# Patient Record
Sex: MALE | URBAN - METROPOLITAN AREA
[De-identification: names, ages, dates, MRNs, and addresses within clinical notes are randomized per-mention and may not be internally consistent; named-entity substitution may affect disease eponyms.]

---

## 2024-09-13 ENCOUNTER — OFFICE VISIT (OUTPATIENT)
Dept: NEUROLOGY | Facility: CLINIC | Age: 34
End: 2024-09-13
Payer: COMMERCIAL

## 2024-09-13 VITALS — SYSTOLIC BLOOD PRESSURE: 120 MMHG | HEART RATE: 69 BPM | DIASTOLIC BLOOD PRESSURE: 81 MMHG | WEIGHT: 177.5 LBS

## 2024-09-13 DIAGNOSIS — G43.019 INTRACTABLE MIGRAINE WITHOUT AURA AND WITHOUT STATUS MIGRAINOSUS: Primary | ICD-10-CM

## 2024-09-13 PROCEDURE — 99999 PR PBB SHADOW E&M-NEW PATIENT-LVL III: CPT | Mod: PBBFAC,,, | Performed by: STUDENT IN AN ORGANIZED HEALTH CARE EDUCATION/TRAINING PROGRAM

## 2024-09-13 RX ORDER — RIMEGEPANT SULFATE 75 MG/75MG
TABLET, ORALLY DISINTEGRATING ORAL
COMMUNITY
Start: 2024-05-03 | End: 2024-09-13

## 2024-09-13 RX ORDER — CETIRIZINE HYDROCHLORIDE 10 MG/1
1 TABLET ORAL DAILY
COMMUNITY
Start: 2024-06-18 | End: 2025-06-18

## 2024-09-13 RX ORDER — UPADACITINIB 30 MG/1
30 TABLET, EXTENDED RELEASE ORAL
COMMUNITY
Start: 2024-10-01

## 2024-09-13 RX ORDER — UPADACITINIB 45 MG/1
TABLET, EXTENDED RELEASE ORAL
COMMUNITY
Start: 2024-07-11

## 2024-09-13 RX ORDER — ERENUMAB-AOOE 70 MG/ML
70 INJECTION SUBCUTANEOUS
Qty: 3 EACH | Refills: 0 | Status: SHIPPED | OUTPATIENT
Start: 2024-09-13 | End: 2024-11-09

## 2024-09-13 RX ORDER — UBROGEPANT 100 MG/1
TABLET ORAL
COMMUNITY
End: 2024-09-13 | Stop reason: SDUPTHER

## 2024-09-13 RX ORDER — UBROGEPANT 100 MG/1
100 TABLET ORAL
Qty: 20 TABLET | Refills: 1 | Status: SHIPPED | OUTPATIENT
Start: 2024-09-13 | End: 2024-10-13

## 2024-09-13 RX ORDER — FLUTICASONE PROPIONATE 50 MCG
SPRAY, SUSPENSION (ML) NASAL
COMMUNITY
Start: 2024-06-05

## 2024-09-13 RX ORDER — MESALAMINE 1000 MG/1
1000 SUPPOSITORY RECTAL
COMMUNITY
Start: 2023-09-28

## 2024-09-13 RX ORDER — MONTELUKAST SODIUM 10 MG/1
10 TABLET ORAL NIGHTLY
COMMUNITY

## 2024-09-13 RX ORDER — MESALAMINE 4 G/60ML
SUSPENSION RECTAL
COMMUNITY
Start: 2024-06-04

## 2024-09-13 NOTE — PROGRESS NOTES
[facility]  Clinic Note    Date of Service: 9/13/2024  Patient seen at the request of: Stacy Chris MD    Reason for Consultation  migraines    Assessment:  [place] is a 34 y.o. male who presents with migraines.    Patient has a history of migraines that began after his diagnosis of UC.  He has already failed both amitriptyline and propranolol as preventative measures.  He has currently found the Ubrelvy has been the best abortive therapy for him at this time.  He continues to have 3-4 migraines per month.  He is currently not on any preventative measures.  Recommend a preventative CGRP inhibitor as an abortive CGRP inhibitor has been effective.    We also spoke about starting migraine supplements.  Will avoid CoQ-10 as this has reportedly exacerbated UC symptoms in the past.    He also has a history of myalgias and dysphagia.  After infliximab was switched to Rinvoq, dysphagia has resolved and myalgias have significantly improved.    He reports that an outside MRI brain was read as normal.        Plan:    - Aimovig 70mg as a preventative  - continue Ubrelvy 100mg as needed  - Headache supplements: Riboflavin (Vit B2) 400mg, Magnesium 400mg (Can get these in [facility].)      - RTC in 3 months      Signed:    Saleem Dumont MD  Neurology/Epilepsy  09/13/2024 10:36 AM      HPI:  [place] is a 34 y.o. male with No past medical history on file.    Per last neurology note:  6/20/22     He has had migraines since he was 25 years old. The pain is pulsatile, bitemporal but sometimes left or right temporal, 9-10 intensity if he doesn't take anything. Frequency is about 1x/week. It responds well to rizatriptan and magnesium citrate for abortive management. He has prodromal symptoms of mild bitemporal pressure, pain in his teeth, and feeling mildly sluggish. He has no aura or other focal neurological symptoms. There is photo/phonophobia. No nausea. Sleep is sometimes helpful. He has rarely woken up  "due to headache. No positional features. It does worsen with valsalva. It improves after having a bowel movement.      He was diagnosed with UC in 2014 and developed migraines the following year. He's noticed an association with bowel movements, in that his most severe headaches are associated with when he's been constipated. Lack of sleep is also a trigger.      He's tried metoprolol before, but it was not clearly helpful. He thinks he's been on amitriptyline.      Sister (35yo) with migraines.      He has a mild history of asthma during early childhood, but hasn't had any issues since middle school. He runs several miles per day. He may sometimes use an inhaler if he has a chest cold.      Prior Visits:   6/20/22: Started propranolol.   8/1/23: Continued propranolol 20mg BID-TID.      Interval History:  Pain in his arms is still present. He is still trying to get to the bottom of what causes it. It is triggered by certain physical activities, like push-ups. He'll get a feeling in his deltoids and biceps that his muscles are going to "pop off" even after 3 push-ups. Symptoms started September 2023. Recent EMG/NCS and MRI brain C-spine. Inflectra was started in August. He'd previously been on infliximab in various forms. Insurance is trying to get him back onto Remicade. The only other drug he started taking around the time was Ubrelvy. He may have initially been taking it every other day during certain periods. When he has inflammation due to IBD symptoms, his migraines get much worse. He was splitting the 100mg to 50mg and taking every other day, ~20 times during the month of September. There had been 1-2 months of missing infliximab infusions.      Symptoms in the arms have gotten slightly better over time, but he's been avoiding exercising as much.      He'd been on amitriptyline before and it didn't help headaches. Before propranolol he was getting migraines 1-2 per week, that is on good weeks when UC was not " "inflamed.       Interval Events:  - patient is now off of propranolol (some efficacy for 2 years but then stopped working and gained weight)  - currently on Ubrelvy 100mg as needed    - for a while patient was on multiple biosimilars for UC, leading to myalgias/dysphagia which have resolved since he switched from infliximab to upadacitinib.  Concern that Ubrelvy could also potentially be contributing.  Patient never got the Nurtec but there was an attempt to switch the abortive therapy.   - myalgias are mostly felt when doing pushups, dysphagia has resolved    - currently having clusters of migraines that average 3-4 per month  - migraines noticeably improved after passing mucousy BMs      This is the extent of the patient's complaints at this time.    No results found for: "TSH", "V12", "[id number]"      Review of Systems:  ROS negative unless noted in HPI    Past Surgical History:  No past surgical history on file.    Family History:  No family history on file.    Social History:       Allergies:  Venlafaxine and Fluticasone propion-salmeterol    Outpatient Medications:  Prior to Admission medications    Not on File       Physical exam:    Vitals: /81 (BP Location: Right arm, Patient Position: Sitting, BP Method: Small (Automatic))   Pulse 69   Wt 80.5 kg (177 lb 7.5 oz)     General:   Sitting in chair, in no distress, well-nourished, well-developed, appears stated age.  Head/Neck:   Normocephalic,atraumatic  Pulm:  Non-labored breathing     Mental Status: Alert and oriented to person, time, place, situation. Speech spontaneous and fluent without paraphasias; no dysarthria  CN:  II: visual fields full  III, IV, VI: EOM intact without nystagmus or diplopia.   V: Sensation to light touch full and symmetric in V1-3. Masseter contraction full bilaterally.   VII: Facial movement full and symmetric.   VIII: Hearing grossly normal to conversation.  IX, X: Palate midline with symmetric elevation.    XI: SCM and " trapezius: 5/5 bilaterally.   XII: Tongue midline without fasciculations.  Motor: Normal bulk and tone throughout all four extremities.   RUE: D: 5/5; B: 5/5; T:  5/5; WF:5/5; WE:  5/5; IO: 5/5   LUE: D: 5/5; B: 5/5; T:  5/5; WF: 5/5; WE:  5/5; IO: 5/5   RLE: HF: 5/5, KE: 5/5, KF: 5/5, DF: 5/5, PF: 5/5  LLE: HF: 5/5, KE: 5/5, KF: 5/5, DF: 5/5, PF: 5/5  No tremors   Sensory: Intact and symmetric to light touch throughout.  Reflexes: RUE: Triceps 2+, biceps 2+, brachioradialis 2+  LUE: Triceps 2+, biceps 2+ brachioradialis 2+  RLE: Knee 2+, ankle 2+  LLE: Knee 2+, ankle 2+  Coordination:  Intact and symmetric to finger-to-nose and heel-to-shin.  Gait:  Intact to casual gait.    Imaging:  All pertinent imaging was personally reviewed.      I spent a total of 41 minutes on the day of the visit. This includes face to face time and non-face to face time preparing to see the patient (eg, review of tests), obtaining and/or reviewing separately obtained history, documenting clinical information in the electronic or other health record, independently interpreting results and communicating results to the patient/family/caregiver, or care coordinator.

## 2024-09-24 ENCOUNTER — PATIENT MESSAGE (OUTPATIENT)
Dept: NEUROLOGY | Facility: CLINIC | Age: 34
End: 2024-09-24
Payer: COMMERCIAL

## 2024-10-03 ENCOUNTER — PATIENT MESSAGE (OUTPATIENT)
Dept: NEUROLOGY | Facility: CLINIC | Age: 34
End: 2024-10-03
Payer: COMMERCIAL

## 2024-10-09 ENCOUNTER — PATIENT MESSAGE (OUTPATIENT)
Dept: NEUROLOGY | Facility: CLINIC | Age: 34
End: 2024-10-09
Payer: COMMERCIAL

## 2024-12-19 NOTE — PROGRESS NOTES
[facility]  Clinic Note    Date of Service: 12/20/2024  Patient seen at the request of: No ref. provider found    Reason for Consultation  migraines    Assessment:  [place] is a 34 y.o. male who presents with migraines.    Patient has a history of migraines that began after his diagnosis of UC.  He has already failed both amitriptyline and propranolol as preventative measures.  He has currently found the Ubrelvy has been the best abortive therapy for him at this time.  He continues to have 3-4 migraines per month.  He is currently not on any preventative measures.  Recommend a preventative CGRP inhibitor as an abortive CGRP inhibitor has been effective.    We also spoke about starting migraine supplements.  Will avoid CoQ-10 as this has reportedly exacerbated UC symptoms in the past.    The combination Ubrelvy and Aimovig has been effective so far.    He also has a history of myalgias and dysphagia.  After infliximab was switched to Rinvoq, dysphagia and myalgias have significantly improved but not completely resolved.  He reports that the fatigable weakness is noticed in all muscle groups.  Myositis antibody panel done in 7/2024 was negative.  He reports that these symptoms tend to fluctuate, associated with some fasciculations.  No upper motor neuron signs seen on exam.  Query the presence of a mild, persistent autoimmune inflammatory myopathy, secondary to a history multiple biosimilar use.  We will refer to Rheumatology for guidance on potentially starting a course of steroids or IVIg.    He reports that an outside MRI brain was read as normal.  EMG nerve conduction study done about a year ago by Dr. Plummer in [place] was reportedly normal.      Plan:    - continue Aimovig 70mg as a preventative  - continue Ubrelvy 100mg as needed  - referral to rheumatology   - hx of being on three different biosimilars for UC   - persistent fatigable weakness even after switching from infliximab to  Rinvoq   - query use of a course of steroids or IVIg?  - EMG/NCS of 2 extremities      - RTC in 3 months      Signed:    Saleem Dumont MD  Neurology/Epilepsy  12/20/2024 10:36 AM        Interval Events:  - patient reports migraines are improving on Aimovig (for 2 weeks) and Ubrelvy  - patient reports exercise intolerance for about a year and a half   - this has been going on for at least 5 years and he seem like his muscles are more fatigable than they were  - EMG/NCS about a year ago was normal (2 extremities, upper and lower on left)    - Anjana Gutierrez is gives the Renvoq        HPI:  [place] is a 34 y.o. male with No past medical history on file.    Per last neurology note:  6/20/22     He has had migraines since he was 25 years old. The pain is pulsatile, bitemporal but sometimes left or right temporal, 9-10 intensity if he doesn't take anything. Frequency is about 1x/week. It responds well to rizatriptan and magnesium citrate for abortive management. He has prodromal symptoms of mild bitemporal pressure, pain in his teeth, and feeling mildly sluggish. He has no aura or other focal neurological symptoms. There is photo/phonophobia. No nausea. Sleep is sometimes helpful. He has rarely woken up due to headache. No positional features. It does worsen with valsalva. It improves after having a bowel movement.      He was diagnosed with UC in 2014 and developed migraines the following year. He's noticed an association with bowel movements, in that his most severe headaches are associated with when he's been constipated. Lack of sleep is also a trigger.      He's tried metoprolol before, but it was not clearly helpful. He thinks he's been on amitriptyline.      Sister (35yo) with migraines.      He has a mild history of asthma during early childhood, but hasn't had any issues since middle school. He runs several miles per day. He may sometimes use an inhaler if he has a chest cold.      Prior Visits:  "  6/20/22: Started propranolol.   8/1/23: Continued propranolol 20mg BID-TID.      Interval History:  Pain in his arms is still present. He is still trying to get to the bottom of what causes it. It is triggered by certain physical activities, like push-ups. He'll get a feeling in his deltoids and biceps that his muscles are going to "pop off" even after 3 push-ups. Symptoms started September 2023. Recent EMG/NCS and MRI brain C-spine. Inflectra was started in August. He'd previously been on infliximab in various forms. Insurance is trying to get him back onto Remicade. The only other drug he started taking around the time was Ubrelvy. He may have initially been taking it every other day during certain periods. When he has inflammation due to IBD symptoms, his migraines get much worse. He was splitting the 100mg to 50mg and taking every other day, ~20 times during the month of September. There had been 1-2 months of missing infliximab infusions.      Symptoms in the arms have gotten slightly better over time, but he's been avoiding exercising as much.      He'd been on amitriptyline before and it didn't help headaches. Before propranolol he was getting migraines 1-2 per week, that is on good weeks when UC was not inflamed.       Interval Events:  - patient is now off of propranolol (some efficacy for 2 years but then stopped working and gained weight)  - currently on Ubrelvy 100mg as needed    - for a while patient was on multiple biosimilars for UC, leading to myalgias/dysphagia which have resolved since he switched from infliximab to upadacitinib.  Concern that Ubrelvy could also potentially be contributing.  Patient never got the Nurtec but there was an attempt to switch the abortive therapy.   - myalgias are mostly felt when doing pushups, dysphagia has resolved    - currently having clusters of migraines that average 3-4 per month  - migraines noticeably improved after passing mucousy BMs      This is the " "extent of the patient's complaints at this time.    No results found for: "TSH", "V12", "[id number]"      Review of Systems:  ROS negative unless noted in HPI    Past Surgical History:  No past surgical history on file.    Family History:  No family history on file.    Social History:       Allergies:  Venlafaxine and Fluticasone propion-salmeterol    Outpatient Medications:  Prior to Admission medications    Not on File       Physical exam:    Vitals: /80 (BP Location: Left arm, Patient Position: Sitting)   Pulse 92   Wt 80 kg (176 lb 5.9 oz)     General:   Sitting in chair, in no distress, well-nourished, well-developed, appears stated age.  Head/Neck:   Normocephalic,atraumatic  Pulm:  Non-labored breathing     Mental Status: Alert and oriented to person, time, place, situation. Speech spontaneous and fluent without paraphasias; no dysarthria  CN:  II: visual fields full  III, IV, VI: EOM intact without nystagmus or diplopia.   V: Sensation to light touch full and symmetric in V1-3. Masseter contraction full bilaterally.   VII: Facial movement full and symmetric.   VIII: Hearing grossly normal to conversation.  IX, X: Palate midline with symmetric elevation.    XI: SCM and trapezius: 5/5 bilaterally.   XII: Tongue midline without fasciculations.  Motor: Normal bulk and tone throughout all four extremities.   RUE: D: 5/5; B: 5/5; T:  5/5; WF:5/5; WE:  5/5; IO: 5/5   LUE: D: 5/5; B: 5/5; T:  5/5; WF: 5/5; WE:  5/5; IO: 5/5   RLE: HF: 5/5, KE: 5/5, KF: 5/5, DF: 5/5, PF: 5/5  LLE: HF: 5/5, KE: 5/5, KF: 5/5, DF: 5/5, PF: 5/5  No tremors   Sensory: Intact and symmetric to light touch throughout.  Reflexes: RUE: Triceps 2+, biceps 2+, brachioradialis 2+  LUE: Triceps 2+, biceps 2+ brachioradialis 2+  RLE: Knee 2+, ankle 2+  LLE: Knee 2+, ankle 2+  Coordination:  Intact and symmetric to finger-to-nose and heel-to-shin.  Gait:  Intact to casual gait.    Imaging:  All pertinent imaging was personally " reviewed.      I spent a total of 40 minutes on the day of the visit. This includes face to face time and non-face to face time preparing to see the patient (eg, review of tests), obtaining and/or reviewing separately obtained history, documenting clinical information in the electronic or other health record, independently interpreting results and communicating results to the patient/family/caregiver, or care coordinator.

## 2024-12-20 ENCOUNTER — OFFICE VISIT (OUTPATIENT)
Dept: NEUROLOGY | Facility: CLINIC | Age: 34
End: 2024-12-20
Payer: COMMERCIAL

## 2024-12-20 VITALS — DIASTOLIC BLOOD PRESSURE: 80 MMHG | WEIGHT: 176.38 LBS | HEART RATE: 92 BPM | SYSTOLIC BLOOD PRESSURE: 122 MMHG

## 2024-12-20 DIAGNOSIS — G43.019 INTRACTABLE MIGRAINE WITHOUT AURA AND WITHOUT STATUS MIGRAINOSUS: Primary | ICD-10-CM

## 2024-12-20 DIAGNOSIS — R53.1 COMPLAINT OF FATIGABLE WEAKNESS: ICD-10-CM

## 2024-12-20 DIAGNOSIS — R53.83 COMPLAINT OF FATIGABLE WEAKNESS: ICD-10-CM

## 2024-12-20 PROCEDURE — 99999 PR PBB SHADOW E&M-EST. PATIENT-LVL III: CPT | Mod: PBBFAC,,, | Performed by: STUDENT IN AN ORGANIZED HEALTH CARE EDUCATION/TRAINING PROGRAM

## 2024-12-20 RX ORDER — ERENUMAB-AOOE 70 MG/ML
70 INJECTION SUBCUTANEOUS
Qty: 1 EACH | Refills: 5 | Status: SHIPPED | OUTPATIENT
Start: 2024-12-20

## 2024-12-20 RX ORDER — UBROGEPANT 100 MG/1
TABLET ORAL
COMMUNITY
Start: 2024-12-08 | End: 2024-12-20 | Stop reason: SDUPTHER

## 2024-12-20 RX ORDER — FLUOXETINE 20 MG/1
1 TABLET ORAL DAILY
COMMUNITY

## 2024-12-20 RX ORDER — UBROGEPANT 100 MG/1
100 TABLET ORAL
Qty: 1 TABLET | Refills: 5 | Status: SHIPPED | OUTPATIENT
Start: 2024-12-20

## 2024-12-20 RX ORDER — ERENUMAB-AOOE 70 MG/ML
INJECTION SUBCUTANEOUS
COMMUNITY
Start: 2024-12-10 | End: 2024-12-20 | Stop reason: SDUPTHER

## 2025-01-03 NOTE — PROGRESS NOTES
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  Symptoms are in all 4 extremities.  Had some tingling in the feet when this first started. Some muscle twitching in the thighs and arms.  Reports some difficulty swallowing primarily with liquids.  Symptoms are worse with activity.   Exam:  5/5 strength bilateral hip flexion, knee extension, ankle dorsiflexion, ankle plantar flexion, toe extension, shoulder abduction, elbow flexion, elbow extension, pronation, finger flexion, finger abduction      Sensory NCS      Nerve / Sites Rec. Site Onset Lat Peak Lat NP Amp PP Amp Segments Distance Velocity Comment     ms ms µV µV  mm m/s    L Sural - (Antidromic)      Calf Ankle 2.92 3.70 24.6 25.8 Calf - Ankle 140 48    R Sural - (Antidromic)      Calf Ankle 2.81 3.59 35.4 41.4 Calf - Ankle 140 50    R Ulnar - Dig V (Antidromic)      Wrist Dig V 2.45 3.02 29.5 38.6 Wrist - Dig V 140 57        Motor NCS      Nerve / Sites Muscle Latency Amplitude Segments Dist. Lat Diff Velocity Comments     ms mV  mm ms m/s    R Peroneal - EDB      Ankle EDB 4.71 9.7 Ankle - EDB 80         B. Fib Head EDB 10.81 8.7 B. Fib Head - Ankle 310 6.10 50.8    R Tibial - AH      Ankle AH 3.88 22.2 Ankle - AH 80      L Tibial - AH      Ankle AH 3.75 18.4 Ankle - AH 80      L Peroneal - EDB      Ankle EDB 4.83 7.9 Ankle - EDB 80         B. Fib Head EDB 10.94 6.8 B. Fib Head - Ankle 298 6.10 48.8    R Ulnar - ADM      Wrist ADM 2.38 12.0 Wrist -  ADM 80         B.Elbow ADM 5.77 11.4 B.Elbow - Wrist 220 3.40 64.8        EMG Summary Table     Spontaneous MUAP Recruitment   Muscle IA Fib PSW Fasc H.F. Amp Dur. PPP Pattern   R. Tibialis anterior N None None None None N N N N   R. Gastrocnemius (Medial head) N None None None None N N N N   R. Peroneus longus N None None None None N N N N   R. Vastus medialis N None None None None 1+ 1+ N Reduced   R. Vastus lateralis N None None None None 1+ 1+ N N   R. Adductor longus N None None None None N N N N   L. Vastus medialis N None None None None N N N N   L. Tibialis anterior N None None None None N N N N   R. Deltoid N None None None None N N N N   R. Biceps brachii N None None None None N N N N   R. Triceps brachii N None None None None N N N N   R. Pronator teres N None None None None N N N N   R. First dorsal interosseous N None None None None N N N N       Summary    The sensory conduction test was normal in all 3 of the tested nerves: L Sural - (Antidromic), R Sural - (Antidromic), R Ulnar - Dig V (Antidromic).    Motor:  The bilateral peroneal, bilateral tibial, and right ulnar motor nerves were normal.    The needle EMG examination was performed in 13 muscles. It was normal in 11 muscle(s): R. Tibialis anterior, R. Gastrocnemius (Medial head), R. Peroneus longus, R. Adductor longus, L. Vastus medialis, L. Tibialis anterior, R. Deltoid, R. Biceps brachii, R. Triceps brachii, R. Pronator teres, R. First dorsal interosseous. The study was abnormal in 2 muscle(s), with the following distribution:  The right vastus medialis demonstrated mildly reduced recruitment of mildly large, long duration motor units.  The right vastus lateralis demonstrated mildly large, long duration motor units.        Conclusion:  Mostly normal study    There is no electrodiagnostic evidence of a myopathy  There is possible electrodiagnostic evidence of a mild chronic right L3 and/or L4 radiculopathy without active/ongoing motor  denervation, however confirmatory findings in other L3 and/or L4 muscles were not found and he denies any radicular symptoms.  There is no electrodiagnostic evidence of a lower limb mononeuropathy on either side.  There is no electrodiagnostic evidence of a motor neuron disease.  There is no electrodiagnostic evidence of a peripheral polyneuropathy.    ________________________  Mima Lin MD

## 2025-01-06 ENCOUNTER — OFFICE VISIT (OUTPATIENT)
Dept: PHYSICAL MEDICINE AND REHAB | Facility: CLINIC | Age: 35
End: 2025-01-06
Payer: COMMERCIAL

## 2025-01-06 DIAGNOSIS — R53.1 COMPLAINT OF FATIGABLE WEAKNESS: ICD-10-CM

## 2025-01-06 DIAGNOSIS — R53.83 COMPLAINT OF FATIGABLE WEAKNESS: ICD-10-CM

## 2025-01-06 PROCEDURE — 95910 NRV CNDJ TEST 7-8 STUDIES: CPT | Mod: S$GLB,,, | Performed by: STUDENT IN AN ORGANIZED HEALTH CARE EDUCATION/TRAINING PROGRAM

## 2025-01-06 PROCEDURE — 95886 MUSC TEST DONE W/N TEST COMP: CPT | Mod: S$GLB,,, | Performed by: STUDENT IN AN ORGANIZED HEALTH CARE EDUCATION/TRAINING PROGRAM

## 2025-01-06 PROCEDURE — 99499 UNLISTED E&M SERVICE: CPT | Mod: S$GLB,,, | Performed by: STUDENT IN AN ORGANIZED HEALTH CARE EDUCATION/TRAINING PROGRAM

## 2025-01-06 PROCEDURE — 95885 MUSC TST DONE W/NERV TST LIM: CPT | Mod: 59,S$GLB,, | Performed by: STUDENT IN AN ORGANIZED HEALTH CARE EDUCATION/TRAINING PROGRAM

## 2025-03-12 ENCOUNTER — OFFICE VISIT (OUTPATIENT)
Dept: URGENT CARE | Facility: CLINIC | Age: 35
End: 2025-03-12
Payer: COMMERCIAL

## 2025-03-12 VITALS
OXYGEN SATURATION: 96 % | HEART RATE: 80 BPM | HEIGHT: 68 IN | DIASTOLIC BLOOD PRESSURE: 83 MMHG | WEIGHT: 176 LBS | RESPIRATION RATE: 18 BRPM | BODY MASS INDEX: 26.67 KG/M2 | SYSTOLIC BLOOD PRESSURE: 122 MMHG | TEMPERATURE: 99 F

## 2025-03-12 DIAGNOSIS — J20.9 ACUTE BRONCHITIS DUE TO INFECTION: Primary | ICD-10-CM

## 2025-03-12 DIAGNOSIS — R05.9 COUGH, UNSPECIFIED TYPE: ICD-10-CM

## 2025-03-12 PROCEDURE — 99213 OFFICE O/P EST LOW 20 MIN: CPT | Mod: S$GLB,,, | Performed by: FAMILY MEDICINE

## 2025-03-12 RX ORDER — DOXYCYCLINE HYCLATE 100 MG
100 TABLET ORAL 2 TIMES DAILY
Qty: 14 TABLET | Refills: 0 | Status: SHIPPED | OUTPATIENT
Start: 2025-03-12 | End: 2025-03-19

## 2025-03-12 RX ORDER — BENZONATATE 200 MG/1
200 CAPSULE ORAL 3 TIMES DAILY PRN
Qty: 21 CAPSULE | Refills: 0 | Status: SHIPPED | OUTPATIENT
Start: 2025-03-12 | End: 2025-03-19

## 2025-03-12 NOTE — PROGRESS NOTES
"Subjective:      Patient ID: New [place]iah is a 35 y.o. male.    Vitals:  height is 5' 8" (1.727 m) and weight is 79.8 kg (176 lb). His oral temperature is 98.6 °F (37 °C). His blood pressure is 122/83 and his pulse is 80. His respiration is 18 and oxygen saturation is 96%.     Chief Complaint: Cough    Patient presents with complaint of cough and chest congestion for last 3 weeks.  States the symptoms started with upper respiratory symptoms which resolved within 1 week but came back about 1 week ago with more severe symptoms and greenish phlegm.  Patient denies fever, chills, chest pain, SOB, weakness.    Cough  This is a new problem. The current episode started 1 to 4 weeks ago (3 weeks). The problem has been gradually worsening. The problem occurs constantly. The cough is Productive of sputum. Pertinent negatives include no chest pain, chills, ear congestion, ear pain, fever, headaches, heartburn, hemoptysis, myalgias, nasal congestion, postnasal drip, rash, rhinorrhea, sore throat, shortness of breath, sweats, weight loss or wheezing. He has tried nothing for the symptoms. The treatment provided no relief. His past medical history is significant for asthma. There is no history of bronchiectasis, bronchitis, COPD, emphysema, environmental allergies or pneumonia.       Constitution: Negative for chills and fever.   HENT:  Negative for ear pain, postnasal drip and sore throat.    Cardiovascular:  Negative for chest pain.   Respiratory:  Positive for cough. Negative for bloody sputum, shortness of breath and wheezing.    Gastrointestinal:  Negative for heartburn.   Musculoskeletal:  Negative for muscle ache.   Skin:  Negative for rash.   Allergic/Immunologic: Negative for environmental allergies.   Neurological:  Negative for headaches.      Objective:     Physical Exam   Constitutional: He is oriented to person, place, and time. He appears well-developed. He is cooperative.  Non-toxic appearance. He does not " appear ill. No distress.   HENT:   Head: Normocephalic and atraumatic.   Ears:   Right Ear: Hearing, tympanic membrane, external ear and ear canal normal. no impacted cerumen  Left Ear: Hearing, tympanic membrane, external ear and ear canal normal. no impacted cerumen  Nose: Congestion present. No mucosal edema, rhinorrhea or nasal deformity. No epistaxis. Right sinus exhibits no maxillary sinus tenderness and no frontal sinus tenderness. Left sinus exhibits no maxillary sinus tenderness and no frontal sinus tenderness.   Mouth/Throat: Uvula is midline, oropharynx is clear and moist and mucous membranes are normal. No trismus in the jaw. Normal dentition. No uvula swelling. No oropharyngeal exudate, posterior oropharyngeal edema or posterior oropharyngeal erythema.   Eyes: Conjunctivae and lids are normal. No scleral icterus.   Neck: Trachea normal and phonation normal. Neck supple. No edema present. No erythema present. No neck rigidity present.   Cardiovascular: Normal rate, regular rhythm, normal heart sounds and normal pulses.   No murmur heard.  Pulmonary/Chest: Effort normal and breath sounds normal. No stridor. No respiratory distress. He has no decreased breath sounds. He has no wheezes. He has no rhonchi. He has no rales.   Abdominal: Normal appearance. He exhibits no distension. There is no abdominal tenderness.   Musculoskeletal: Normal range of motion.         General: No deformity. Normal range of motion.      Cervical back: He exhibits no tenderness.   Lymphadenopathy:     He has no cervical adenopathy.   Neurological: He is alert, oriented to person, place, and time and at baseline. He exhibits normal muscle tone. Coordination normal.   Skin: Skin is warm, dry, intact, not diaphoretic and not pale.   Psychiatric: His speech is normal and behavior is normal. Judgment and thought content normal.   Nursing note and vitals reviewed.      Assessment:     1. Acute bronchitis due to infection    2. Cough,  unspecified type        Plan:   Discussed exam findings/results/diagnosis/plan with patient. Advised to f/u with PCP within 2-5 days. ER precautions given if symptoms get any worse. All questions answered. Patient verbally understood and agreed with treatment plan.  Educational materials and instructions regarding the visit diagnosis and management provided.     Acute bronchitis due to infection    Cough, unspecified type    Other orders  -     doxycycline (VIBRA-TABS) 100 MG tablet; Take 1 tablet (100 mg total) by mouth 2 (two) times daily. for 7 days  Dispense: 14 tablet; Refill: 0  -     benzonatate (TESSALON) 200 MG capsule; Take 1 capsule (200 mg total) by mouth 3 (three) times daily as needed for Cough.  Dispense: 21 capsule; Refill: 0

## 2025-03-13 NOTE — PROGRESS NOTES
[facility]  Clinic Note    Date of Service: 3/13/2025  Patient seen at the request of: No ref. provider found    Reason for Consultation  migraines    Assessment:  [place] is a 35 y.o. male who presents with migraines.    Patient has a history of migraines that began after his diagnosis of UC.  He has already failed both amitriptyline and propranolol as preventative measures.  He has currently found the Ubrelvy has been the best abortive therapy for him at this time.  He continues to have 3-4 migraines per month.  He is currently not on any preventative measures.  Recommend a preventative CGRP inhibitor as an abortive CGRP inhibitor has been effective.    We also spoke about starting migraine supplements.  Will avoid CoQ-10 as this has reportedly exacerbated UC symptoms in the past.    The combination Ubrelvy and Aimovig has been effective so far.    He also has a history of myalgias and dysphagia.  After infliximab was switched to Rinvoq, dysphagia and myalgias have significantly improved but not completely resolved.  He reports that the fatigable weakness is noticed in all muscle groups.  Myositis antibody panel done in 7/2024 was negative.  He reports that these symptoms tend to fluctuate, associated with some fasciculations.  No upper motor neuron signs seen on exam.  Query the presence of a mild, persistent autoimmune inflammatory myopathy, secondary to a history multiple biosimilar use.  Referred to Rheumatology for guidance on potentially starting a course of steroids or IVIg.    Myalgias and dysphagia have now resolved after seeing steady improvement at the end of 2024.  Query effect of coming off of infliximab.    He reports that an outside MRI brain was read as normal.  EMG nerve conduction study done about a year ago by Dr. Plummer in [place] was reportedly normal.    EMG/NCS -   There is no electrodiagnostic evidence of a myopathy  There is possible electrodiagnostic evidence of a mild  chronic right L3 and/or L4 radiculopathy without active/ongoing motor denervation, however confirmatory findings in other L3 and/or L4 muscles were not found and he denies any radicular symptoms.  There is no electrodiagnostic evidence of a lower limb mononeuropathy on either side.  There is no electrodiagnostic evidence of a motor neuron disease.  There is no electrodiagnostic evidence of a peripheral polyneuropathy.      Plan:    - continue Aimovig 70mg as a preventative  - continue Ubrelvy 100mg as needed      - RTC in 3 months      Signed:    Saleem Dumont MD  Neurology/Epilepsy  03/13/2025 10:36 AM        Interval Events:  - Patient reports myalgias and dysphagia slowly improves in December of 2023 and is now back to baseline since January of 2025.  - Patient using Ubrelvy about once every 1.5 weeks.  - Patient's mother was recently diagnoses with GBM now s/p resection.      - patient reports migraines are improving on Aimovig (for 2 weeks) and Ubrelvy  - patient reports exercise intolerance for about a year and a half   - this has been going on for at least 5 years and he seem like his muscles are more fatigable than they were  - EMG/NCS about a year ago was normal (2 extremities, upper and lower on left)    - Anjana Gutierrez is gives the Renvoq        HPI:  [place] is a 35 y.o. male with No past medical history on file.    Per last neurology note:  6/20/22     He has had migraines since he was 25 years old. The pain is pulsatile, bitemporal but sometimes left or right temporal, 9-10 intensity if he doesn't take anything. Frequency is about 1x/week. It responds well to rizatriptan and magnesium citrate for abortive management. He has prodromal symptoms of mild bitemporal pressure, pain in his teeth, and feeling mildly sluggish. He has no aura or other focal neurological symptoms. There is photo/phonophobia. No nausea. Sleep is sometimes helpful. He has rarely woken up due to headache. No positional  "features. It does worsen with valsalva. It improves after having a bowel movement.      He was diagnosed with UC in 2014 and developed migraines the following year. He's noticed an association with bowel movements, in that his most severe headaches are associated with when he's been constipated. Lack of sleep is also a trigger.      He's tried metoprolol before, but it was not clearly helpful. He thinks he's been on amitriptyline.      Sister (35yo) with migraines.      He has a mild history of asthma during early childhood, but hasn't had any issues since middle school. He runs several miles per day. He may sometimes use an inhaler if he has a chest cold.      Prior Visits:   6/20/22: Started propranolol.   8/1/23: Continued propranolol 20mg BID-TID.      Interval History:  Pain in his arms is still present. He is still trying to get to the bottom of what causes it. It is triggered by certain physical activities, like push-ups. He'll get a feeling in his deltoids and biceps that his muscles are going to "pop off" even after 3 push-ups. Symptoms started September 2023. Recent EMG/NCS and MRI brain C-spine. Inflectra was started in August. He'd previously been on infliximab in various forms. Insurance is trying to get him back onto Remicade. The only other drug he started taking around the time was Ubrelvy. He may have initially been taking it every other day during certain periods. When he has inflammation due to IBD symptoms, his migraines get much worse. He was splitting the 100mg to 50mg and taking every other day, ~20 times during the month of September. There had been 1-2 months of missing infliximab infusions.      Symptoms in the arms have gotten slightly better over time, but he's been avoiding exercising as much.      He'd been on amitriptyline before and it didn't help headaches. Before propranolol he was getting migraines 1-2 per week, that is on good weeks when UC was not inflamed.       Interval " "Events:  - patient is now off of propranolol (some efficacy for 2 years but then stopped working and gained weight)  - currently on Ubrelvy 100mg as needed    - for a while patient was on multiple biosimilars for UC, leading to myalgias/dysphagia which have resolved since he switched from infliximab to upadacitinib.  Concern that Ubrelvy could also potentially be contributing.  Patient never got the Nurtec but there was an attempt to switch the abortive therapy.   - myalgias are mostly felt when doing pushups, dysphagia has resolved    - currently having clusters of migraines that average 3-4 per month  - migraines noticeably improved after passing mucousy BMs      This is the extent of the patient's complaints at this time.    No results found for: "TSH", "V12", "[id number]"      Review of Systems:  ROS negative unless noted in HPI    Past Surgical History:  No past surgical history on file.    Family History:  No family history on file.    Social History:  Social History     Tobacco Use    Smoking status: Never    Smokeless tobacco: Never       Allergies:  Venlafaxine and Fluticasone propion-salmeterol    Outpatient Medications:  Prior to Admission medications    Not on File       Physical exam:    Vitals: There were no vitals taken for this visit.    General:   Sitting in chair, in no distress, well-nourished, well-developed, appears stated age.  Head/Neck:   Normocephalic,atraumatic  Pulm:  Non-labored breathing     Mental Status: Alert and oriented to person, time, place, situation. Speech spontaneous and fluent without paraphasias; no dysarthria  CN:  II: visual fields full  III, IV, VI: EOM intact without nystagmus or diplopia.   V: Sensation to light touch full and symmetric in V1-3. [place] contraction full bilaterally.   VII: Facial movement full and symmetric.   VIII: Hearing grossly normal to conversation.  IX, X: Palate midline with symmetric elevation.    XI: SCM and trapezius: 5/5 bilaterally.   XII: " Tongue midline without fasciculations.  Motor: Normal bulk and tone throughout all four extremities.   RUE: D: 5/5; B: 5/5; T:  5/5; WF:5/5; WE:  5/5; IO: 5/5   LUE: D: 5/5; B: 5/5; T:  5/5; WF: 5/5; WE:  5/5; IO: 5/5   RLE: HF: 5/5, KE: 5/5, KF: 5/5, DF: 5/5, PF: 5/5  LLE: HF: 5/5, KE: 5/5, KF: 5/5, DF: 5/5, PF: 5/5  No tremors   Sensory: Intact and symmetric to light touch throughout.  Reflexes: RUE: Triceps 2+, biceps 2+, brachioradialis 2+  LUE: Triceps 2+, biceps 2+ brachioradialis 2+  RLE: Knee 2+, ankle 2+  LLE: Knee 2+, ankle 2+  Coordination:  Intact and symmetric to finger-to-nose and heel-to-shin.  Gait:  Intact to casual gait.    Imaging:  All pertinent imaging was personally reviewed.      I spent a total of 35 minutes on the day of the visit. This includes face to face time and non-face to face time preparing to see the patient (eg, review of tests), obtaining and/or reviewing separately obtained history, documenting clinical information in the electronic or other health record, independently interpreting results and communicating results to the patient/family/caregiver, or care coordinator.

## 2025-03-14 ENCOUNTER — OFFICE VISIT (OUTPATIENT)
Dept: NEUROLOGY | Facility: CLINIC | Age: 35
End: 2025-03-14
Payer: COMMERCIAL

## 2025-03-14 VITALS
HEART RATE: 70 BPM | SYSTOLIC BLOOD PRESSURE: 125 MMHG | DIASTOLIC BLOOD PRESSURE: 81 MMHG | BODY MASS INDEX: 28.83 KG/M2 | WEIGHT: 189.63 LBS

## 2025-03-14 DIAGNOSIS — G43.019 INTRACTABLE MIGRAINE WITHOUT AURA AND WITHOUT STATUS MIGRAINOSUS: Primary | ICD-10-CM

## 2025-03-14 PROCEDURE — 99999 PR PBB SHADOW E&M-EST. PATIENT-LVL III: CPT | Mod: PBBFAC,,, | Performed by: STUDENT IN AN ORGANIZED HEALTH CARE EDUCATION/TRAINING PROGRAM

## 2025-03-14 RX ORDER — UBROGEPANT 100 MG/1
100 TABLET ORAL
Qty: 10 TABLET | Refills: 5 | Status: SHIPPED | OUTPATIENT
Start: 2025-03-14

## 2025-03-14 RX ORDER — ERENUMAB-AOOE 70 MG/ML
70 INJECTION SUBCUTANEOUS
Qty: 1 EACH | Refills: 5 | Status: SHIPPED | OUTPATIENT
Start: 2025-03-14

## 2025-03-14 NOTE — PROGRESS NOTES
Patient reports myalgias and dysphagia slowly improves in December of 2023 and is now back to baseline since January of 2025.    Patient's mother was recently diagnoses with GBM now s/p resection.      Patient using Ubrelvy about once every 1.5 weeks.

## 2025-03-25 ENCOUNTER — OFFICE VISIT (OUTPATIENT)
Dept: URGENT CARE | Facility: CLINIC | Age: 35
End: 2025-03-25
Payer: COMMERCIAL

## 2025-03-25 VITALS
HEIGHT: 68 IN | BODY MASS INDEX: 26.52 KG/M2 | TEMPERATURE: 99 F | SYSTOLIC BLOOD PRESSURE: 116 MMHG | WEIGHT: 175 LBS | RESPIRATION RATE: 19 BRPM | DIASTOLIC BLOOD PRESSURE: 79 MMHG | OXYGEN SATURATION: 98 % | HEART RATE: 91 BPM

## 2025-03-25 DIAGNOSIS — B96.89 BACTERIAL UPPER RESPIRATORY INFECTION: Primary | ICD-10-CM

## 2025-03-25 DIAGNOSIS — J06.9 BACTERIAL UPPER RESPIRATORY INFECTION: Primary | ICD-10-CM

## 2025-03-25 DIAGNOSIS — R05.9 COUGH, UNSPECIFIED TYPE: ICD-10-CM

## 2025-03-25 PROCEDURE — 71046 X-RAY EXAM CHEST 2 VIEWS: CPT | Mod: S$GLB,,, | Performed by: RADIOLOGY

## 2025-03-25 PROCEDURE — 99214 OFFICE O/P EST MOD 30 MIN: CPT | Mod: S$GLB,,, | Performed by: NURSE PRACTITIONER

## 2025-03-25 RX ORDER — AMOXICILLIN AND CLAVULANATE POTASSIUM 875; 125 MG/1; MG/1
1 TABLET, FILM COATED ORAL 2 TIMES DAILY
Qty: 20 TABLET | Refills: 0 | Status: SHIPPED | OUTPATIENT
Start: 2025-03-25

## 2025-03-25 RX ORDER — PREDNISONE 20 MG/1
40 TABLET ORAL DAILY
Qty: 10 TABLET | Refills: 0 | Status: SHIPPED | OUTPATIENT
Start: 2025-03-25 | End: 2025-03-30

## 2025-03-25 RX ORDER — BENZONATATE 200 MG/1
200 CAPSULE ORAL 3 TIMES DAILY PRN
Qty: 30 CAPSULE | Refills: 0 | Status: SHIPPED | OUTPATIENT
Start: 2025-03-25 | End: 2025-04-04

## 2025-03-25 NOTE — PATIENT INSTRUCTIONS
- You must understand that you have received an Urgent Care treatment only and that you may be released before all of your medical problems are known or treated.   - You, the patient, will arrange for follow up care as instructed.   - If your condition worsens or fails to improve we recommend that you receive another evaluation at the ER immediately or contact your PCP to discuss your concerns.   - You can call [phone] or [phone] to help schedule an appointment with the appropriate provider.      Drink plenty of fluids   Get lots of rest  Tylenol or ibuprofen for pain/fever  Mucinex DM for cough  Saline nasal rinses to irrigate sinus cavities  Warm salt water gargles for sore throat    
[phone]

## 2025-03-25 NOTE — PROGRESS NOTES
"Subjective:      Patient ID: [place] is a 35 y.o. male.    Vitals:  height is 5' 8" (1.727 m) and weight is 79.4 kg (175 lb). His oral temperature is 99.1 °F (37.3 °C). His blood pressure is 116/79 and his pulse is 91. His respiration is 19 and oxygen saturation is 98%.     Chief Complaint: Cough    Pt is a 34 yo male presenting with a persisting cough. Pt was seen 13 days ago and was given 7 day antibiotics. Onset of symptoms was 2 weeks ago. Pt reports using prescription antibiotics  that gave relief for a little while and cough came back but I worst when laying down.      Cough  This is a new problem. The current episode started 1 to 4 weeks ago. The problem has been unchanged. The problem occurs constantly. The cough is Productive of sputum. Pertinent negatives include no chest pain, ear congestion, ear pain, headaches, nasal congestion, postnasal drip, sore throat, shortness of breath or wheezing. His past medical history is significant for asthma and bronchitis. There is no history of COPD or pneumonia.       HENT:  Negative for ear pain, postnasal drip and sore throat.    Cardiovascular:  Negative for chest pain.   Respiratory:  Positive for cough. Negative for shortness of breath and wheezing.    Neurological:  Negative for headaches.      Objective:     Physical Exam   Constitutional: He is oriented to person, place, and time. He appears well-developed. He is cooperative.  Non-toxic appearance. He does not appear ill. No distress.   HENT:   Head: Normocephalic and atraumatic.   Ears:   Right Ear: Hearing, tympanic membrane, external ear and ear canal normal.   Left Ear: Hearing, tympanic membrane, external ear and ear canal normal.   Nose: Nose normal. No mucosal edema, rhinorrhea or nasal deformity. No epistaxis. Right sinus exhibits no maxillary sinus tenderness and no frontal sinus tenderness. Left sinus exhibits no maxillary sinus tenderness and no frontal sinus tenderness.   Mouth/Throat: " Uvula is midline, oropharynx is clear and moist and mucous membranes are normal. No trismus in the jaw. Normal dentition. No uvula swelling. No oropharyngeal exudate, posterior oropharyngeal edema or posterior oropharyngeal erythema. Tonsils are 1+ on the right. Tonsils are 1+ on the left. No tonsillar exudate.   Eyes: Conjunctivae and lids are normal. No scleral icterus.   Neck: Trachea normal and phonation normal. Neck supple. No edema present. No erythema present. No neck rigidity present.   Cardiovascular: Normal rate, regular rhythm, normal heart sounds and normal pulses.   Pulmonary/Chest: Effort normal and breath sounds normal. No respiratory distress. He has no decreased breath sounds. He has no wheezes. He has no rhonchi.   cough         Comments: cough    Abdominal: Normal appearance.   Musculoskeletal: Normal range of motion.         General: No deformity. Normal range of motion.   Neurological: He is alert and oriented to person, place, and time. He exhibits normal muscle tone. Coordination normal.   Skin: Skin is warm, dry, intact, not diaphoretic and not pale.   Psychiatric: His speech is normal and behavior is normal. Judgment and thought content normal.   Nursing note and vitals reviewed.    XR CHEST PA AND LATERAL  Result Date: 3/25/2025  EXAMINATION: XR CHEST PA AND LATERAL CLINICAL HISTORY: Cough, unspecified TECHNIQUE: PA and lateral views of the chest were performed. COMPARISON: None FINDINGS: Lungs are clear. No focal consolidation. No pleural effusion. No pneumothorax. Normal heart size.     No acute findings Electronically signed by: Zahraa Adair Date:    03/25/2025 Time:    17:53      Assessment:     1. Bacterial upper respiratory infection    2. Cough, unspecified type        Plan:       Bacterial upper respiratory infection  -     amoxicillin-clavulanate 875-125mg (AUGMENTIN) 875-125 mg per tablet; Take 1 tablet by mouth 2 (two) times daily.  Dispense: 20 tablet; Refill: 0    Cough,  unspecified type  -     XR CHEST PA AND LATERAL; Future; Expected date: 03/25/2025  -     benzonatate (TESSALON) 200 MG capsule; Take 1 capsule (200 mg total) by mouth 3 (three) times daily as needed for Cough.  Dispense: 30 capsule; Refill: 0  -     predniSONE (DELTASONE) 20 MG tablet; Take 2 tablets (40 mg total) by mouth once daily. for 5 days  Dispense: 10 tablet; Refill: 0      Patient Instructions   - You must understand that you have received an Urgent Care treatment only and that you may be released before all of your medical problems are known or treated.   - You, the patient, will arrange for follow up care as instructed.   - If your condition worsens or fails to improve we recommend that you receive another evaluation at the ER immediately or contact your PCP to discuss your concerns.   - You can call [phone] or [phone] to help schedule an appointment with the appropriate provider.      Drink plenty of fluids   Get lots of rest  Tylenol or ibuprofen for pain/fever  Mucinex DM for cough  Saline nasal rinses to irrigate sinus cavities  Warm salt water gargles for sore throat

## 2025-03-27 ENCOUNTER — PATIENT MESSAGE (OUTPATIENT)
Dept: NEUROLOGY | Facility: CLINIC | Age: 35
End: 2025-03-27
Payer: COMMERCIAL

## 2025-03-27 DIAGNOSIS — G43.019 INTRACTABLE MIGRAINE WITHOUT AURA AND WITHOUT STATUS MIGRAINOSUS: Primary | ICD-10-CM

## 2025-03-27 RX ORDER — UBROGEPANT 100 MG/1
100 TABLET ORAL
Qty: 10 TABLET | Refills: 5 | Status: SHIPPED | OUTPATIENT
Start: 2025-03-27

## 2025-03-27 RX ORDER — ERENUMAB-AOOE 70 MG/ML
70 INJECTION SUBCUTANEOUS
Qty: 1 EACH | Refills: 5 | Status: SHIPPED | OUTPATIENT
Start: 2025-03-27

## 2025-07-28 ENCOUNTER — PATIENT MESSAGE (OUTPATIENT)
Dept: NEUROLOGY | Facility: CLINIC | Age: 35
End: 2025-07-28
Payer: COMMERCIAL

## 2025-07-28 DIAGNOSIS — G43.019 INTRACTABLE MIGRAINE WITHOUT AURA AND WITHOUT STATUS MIGRAINOSUS: ICD-10-CM

## 2025-07-29 RX ORDER — UBROGEPANT 100 MG/1
100 TABLET ORAL
Qty: 10 TABLET | Refills: 5 | Status: SHIPPED | OUTPATIENT
Start: 2025-07-29

## 2025-08-05 ENCOUNTER — OFFICE VISIT (OUTPATIENT)
Dept: NEUROLOGY | Facility: CLINIC | Age: 35
End: 2025-08-05
Payer: COMMERCIAL

## 2025-08-05 VITALS
DIASTOLIC BLOOD PRESSURE: 80 MMHG | HEIGHT: 68 IN | HEART RATE: 65 BPM | WEIGHT: 196.19 LBS | OXYGEN SATURATION: 97 % | SYSTOLIC BLOOD PRESSURE: 127 MMHG | BODY MASS INDEX: 29.73 KG/M2

## 2025-08-05 DIAGNOSIS — R53.1 COMPLAINT OF FATIGABLE WEAKNESS: ICD-10-CM

## 2025-08-05 DIAGNOSIS — K51.80 OTHER ULCERATIVE COLITIS WITHOUT COMPLICATION: ICD-10-CM

## 2025-08-05 DIAGNOSIS — G47.00 INSOMNIA, UNSPECIFIED TYPE: ICD-10-CM

## 2025-08-05 DIAGNOSIS — G43.019 INTRACTABLE MIGRAINE WITHOUT AURA AND WITHOUT STATUS MIGRAINOSUS: Primary | ICD-10-CM

## 2025-08-05 DIAGNOSIS — R53.83 COMPLAINT OF FATIGABLE WEAKNESS: ICD-10-CM

## 2025-08-05 PROBLEM — K51.90 ULCERATIVE COLITIS WITHOUT COMPLICATIONS: Status: ACTIVE | Noted: 2025-08-05

## 2025-08-05 PROCEDURE — 3079F DIAST BP 80-89 MM HG: CPT | Mod: CPTII,S$GLB,, | Performed by: STUDENT IN AN ORGANIZED HEALTH CARE EDUCATION/TRAINING PROGRAM

## 2025-08-05 PROCEDURE — 99215 OFFICE O/P EST HI 40 MIN: CPT | Mod: S$GLB,,, | Performed by: STUDENT IN AN ORGANIZED HEALTH CARE EDUCATION/TRAINING PROGRAM

## 2025-08-05 PROCEDURE — 99999 PR PBB SHADOW E&M-EST. PATIENT-LVL III: CPT | Mod: PBBFAC,,, | Performed by: STUDENT IN AN ORGANIZED HEALTH CARE EDUCATION/TRAINING PROGRAM

## 2025-08-05 PROCEDURE — 3074F SYST BP LT 130 MM HG: CPT | Mod: CPTII,S$GLB,, | Performed by: STUDENT IN AN ORGANIZED HEALTH CARE EDUCATION/TRAINING PROGRAM

## 2025-08-05 PROCEDURE — 3008F BODY MASS INDEX DOCD: CPT | Mod: CPTII,S$GLB,, | Performed by: STUDENT IN AN ORGANIZED HEALTH CARE EDUCATION/TRAINING PROGRAM

## 2025-08-05 PROCEDURE — 1159F MED LIST DOCD IN RCRD: CPT | Mod: CPTII,S$GLB,, | Performed by: STUDENT IN AN ORGANIZED HEALTH CARE EDUCATION/TRAINING PROGRAM

## 2025-08-05 RX ORDER — ERENUMAB-AOOE 140 MG/ML
140 INJECTION, SOLUTION SUBCUTANEOUS
Qty: 1 ML | Refills: 5 | Status: SHIPPED | OUTPATIENT
Start: 2025-08-05 | End: 2026-02-01

## 2025-08-05 RX ORDER — UBROGEPANT 100 MG/1
100 TABLET ORAL
Qty: 10 TABLET | Refills: 5 | Status: SHIPPED | OUTPATIENT
Start: 2025-08-05 | End: 2025-08-21

## 2025-08-05 NOTE — PROGRESS NOTES
Chief Complaint and Duration     Chief Complaint   Patient presents with    Migraine    Establish Care      History of Present Illness     Presents to establish care with a new neurologist for ongoing migraine management and to discuss recent episodes of lightheadedness.    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 He has been on Aimovig since 2024 and Ubrelvy since 2023 for migraine management, which has significantly improved his condition. He reports needing Ubrelvy approximately 6 times per month, at a dose of 50 - 100 mg. This regimen has been the most effective so far, allowing him to complete residency responsibilities.    In the past week, he has had new symptoms of lightheadedness. He describes it as a sensation similar to dizziness after spinning, occurring without actual spinning. This sensation is triggered by extraocular movements and changing head position from down to up. He denies feeling like he will pass out. 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    Previously, he had muscle pain and fasciculations, which were medication-induced from infliximab, prescribed for ulcerative colitis. These symptoms resolved after switching to Rinvoq. His GI doctor at [facility], Dr. Gutierrez, made this medication change when the neurological symptoms appeared. His CK level was also normal at 97.    Prior to the current regimen, he tried several medications for migraine management, including propranolol, amitriptyline, metoprolol, and sumatriptan. He also used Tylenol extensively in the past, which led to medication overuse headaches before starting on prescription migraine medications. He has not used NSAIDs due to his ulcerative colitis.    He denies room-spinning sensations or feeling like he will pass out.    Occupation:  Neurology resident at [employer]          Review of patient's allergies indicates:   Allergen Reactions    Venlafaxine Other (See Comments)     Suicidal thoughts    Fluticasone propion-salmeterol Palpitations     Current Medications[1]    Medical History     Past Medical History:   Diagnosis Date    Anxiety     Asthma     Depression      No past surgical history on file.  Family History   Problem Relation Name Age of Onset    Brain cancer Mother       Social History[2]    Exam     Vitals:    08/05/25 1015   BP: 127/80   Pulse: 65      Physical Exam:  General: Not in acute distress. Not ill-appearing.   HENT: Normocephalic and atraumatic. Moist mucous membranes.  Eyes: Conjunctivae normal.   Pulmonary: Pulmonary effort is normal.   Skin: Skin is warm and dry. No rashes.   Psychiatric: Mood normal.        Neurologic Exam   Mental status: oriented to person, place, and time  Attention: Normal. Concentration: normal.  Speech: speech is normal.  Cranial Nerves: Symmetric facies. Hearing grossly intact    Labs and Imaging     Labs: reviewed  Lab Results   Component Value Date    HGBA1C 4.9 08/04/2023       Assessment and Plan     Problem List Items Addressed This Visit          Neuro    Intractable migraine without aura and without status migrainosus - Primary    Relevant Medications    erenumab-aooe (AIMOVIG AUTOINJECTOR) 140 mg/mL autoinjector    UBRELVY 100 mg tablet       GI    Ulcerative colitis without complications       Other    Complaint of fatigable weakness    Insomnia       G43.711 Chronic migraine without aura, intractable, with status migrainosus  K51.90 Ulcerative colitis, unspecified, without complications  G44.41 Drug-induced headache, not elsewhere classified, intractable  R42 Dizziness and giddiness  Z88.6 Allergy status to analgesic agent  Z88.9 Allergy status to unspecified drugs, medicaments and biological substances    Patient new to me.    - Migraine management well-controlled with current regimen of  Aimovig and Ubrelvy.  - Increased Aimovig dosage from 70 mg to 140 mg monthly to further reduce breakthrough migraines and Ubrelvy usage.  - Evaluated recent onset of non-vertigo dizziness, opting to monitor symptoms before pursuing advanced imaging (e.g., CTA) to rule out intracranial stenosis.  - Current migraine treatment is optimal, avoiding frequent medication switches to prevent future intractability.    - Explained the goal of preventive treatment is to reduce reliance on acute medications like Ubrelvy.    - Follow up in 6 months. Sample of nurtec also given.       Discussed w patient multifactorial nature of symptoms. Discussed lifestyle modifications including diet and exercise.    Questions answered w patient. Appreciate opportunity to care for this patient.    Follow-up: 6 months, recall placed.     Time spent on this encounter: 40 minutes. This includes face to face time and non-face to face time preparing to see the patient (eg, review of tests), obtaining and/or reviewing separately obtained history, documenting clinical information in the electronic or other health record, independently interpreting results and communicating results to the patient/family/caregiver, or care coordinator.     This note was created by combination of typed  and M-Modal dictation. Transcription and phonetic errors may be present.  If there are any questions, please contact me.    This note was generated with the assistance of ambient listening technology. Verbal consent was obtained by the patient and accompanying visitor(s) for the recording of patient appointment to facilitate this note. I attest to having reviewed and edited the generated note for accuracy, though some syntax or spelling errors may persist. Please contact the author of this note for any clarification.            [1]   Current Outpatient Medications   Medication Sig Dispense Refill    amoxicillin-clavulanate 875-125mg (AUGMENTIN) 875-125 mg per  tablet Take 1 tablet by mouth 2 (two) times daily. 20 tablet 0    FLUoxetine 20 MG tablet Take 1 tablet by mouth once daily.      fluticasone propionate (FLONASE) 50 mcg/actuation nasal spray       mesalamine (CANASA) 1000 MG Supp Place 1,000 mg rectally.      mesalamine (ROWASA) 4 gram/60 mL Enem       montelukast (SINGULAIR) 10 mg tablet Take 10 mg by mouth every evening.      upadacitinib (RINVOQ) 30 mg Tb24 Take 30 mg by mouth.      cetirizine (ZYRTEC) 10 MG tablet Take 1 tablet by mouth once daily.      erenumab-aooe (AIMOVIG AUTOINJECTOR) 140 mg/mL autoinjector Inject 1 mL (140 mg total) into the skin every 28 days. 1 mL 5    UBRELVY 100 mg tablet Take 1 tablet (100 mg total) by mouth as needed for Migraine. If symptoms persist or return, may repeat dose after 2 hours. Maximum: 200 mg per 24 hours. 10 tablet 5     No current facility-administered medications for this visit.   [2]   Social History  Socioeconomic History    Marital status: Unknown   Tobacco Use    Smoking status: Never    Smokeless tobacco: Never   Substance and Sexual Activity    Alcohol use: Never    Drug use: Never